# Patient Record
Sex: MALE | Race: WHITE
[De-identification: names, ages, dates, MRNs, and addresses within clinical notes are randomized per-mention and may not be internally consistent; named-entity substitution may affect disease eponyms.]

---

## 2020-04-16 ENCOUNTER — HOSPITAL ENCOUNTER (OUTPATIENT)
Dept: HOSPITAL 33 - ED | Age: 85
Setting detail: OBSERVATION
LOS: 2 days | Discharge: HOME | End: 2020-04-18
Attending: FAMILY MEDICINE | Admitting: FAMILY MEDICINE
Payer: MEDICARE

## 2020-04-16 DIAGNOSIS — M51.36: ICD-10-CM

## 2020-04-16 DIAGNOSIS — M48.061: ICD-10-CM

## 2020-04-16 DIAGNOSIS — E03.9: ICD-10-CM

## 2020-04-16 DIAGNOSIS — E78.00: ICD-10-CM

## 2020-04-16 DIAGNOSIS — Z79.01: ICD-10-CM

## 2020-04-16 DIAGNOSIS — E53.8: ICD-10-CM

## 2020-04-16 DIAGNOSIS — I71.4: ICD-10-CM

## 2020-04-16 DIAGNOSIS — I25.10: ICD-10-CM

## 2020-04-16 DIAGNOSIS — M54.9: ICD-10-CM

## 2020-04-16 DIAGNOSIS — Z79.899: ICD-10-CM

## 2020-04-16 DIAGNOSIS — I10: ICD-10-CM

## 2020-04-16 DIAGNOSIS — M54.5: Primary | ICD-10-CM

## 2020-04-16 DIAGNOSIS — Z85.51: ICD-10-CM

## 2020-04-16 DIAGNOSIS — R00.1: ICD-10-CM

## 2020-04-16 LAB
ALBUMIN SERPL-MCNC: 4.4 G/DL (ref 3.5–5)
ALP SERPL-CCNC: 103 U/L (ref 38–126)
ALT SERPL-CCNC: 19 U/L (ref 0–50)
ANION GAP SERPL CALC-SCNC: 12.1 MEQ/L (ref 5–15)
AST SERPL QL: 26 U/L (ref 17–59)
BASOPHILS # BLD AUTO: 0.04 10*3/UL (ref 0–0.4)
BASOPHILS NFR BLD AUTO: 0.7 % (ref 0–0.4)
BILIRUB BLD-MCNC: 0.7 MG/DL (ref 0.2–1.3)
BUN SERPL-MCNC: 19 MG/DL (ref 9–20)
CALCIUM SPEC-MCNC: 10.8 MG/DL (ref 8.4–10.2)
CHLORIDE SERPL-SCNC: 106 MMOL/L (ref 98–107)
CO2 SERPL-SCNC: 26 MMOL/L (ref 22–30)
CREAT SERPL-MCNC: 1.25 MG/DL (ref 0.66–1.25)
EOSINOPHIL # BLD AUTO: 0.18 10*3/UL (ref 0–0.5)
GLUCOSE SERPL-MCNC: 95 MG/DL (ref 74–106)
GLUCOSE UR-MCNC: NEGATIVE MG/DL
HCT VFR BLD AUTO: 42.2 % (ref 42–50)
HGB BLD-MCNC: 14.1 GM/DL (ref 12.5–18)
INR PPP: 3.31 (ref 0.8–3)
LYMPHOCYTES # SPEC AUTO: 1.01 10*3/UL (ref 1–4.6)
MCH RBC QN AUTO: 32 PG (ref 26–32)
MCHC RBC AUTO-ENTMCNC: 33.4 G/DL (ref 32–36)
MONOCYTES # BLD AUTO: 0.39 10*3/UL (ref 0–1.3)
PLATELET # BLD AUTO: 160 K/MM3 (ref 150–450)
POTASSIUM SERPLBLD-SCNC: 3.9 MMOL/L (ref 3.5–5.1)
PROT SERPL-MCNC: 7.8 G/DL (ref 6.3–8.2)
PROT UR STRIP-MCNC: NEGATIVE MG/DL
PROTHROMBIN TIME: 38.3 SECONDS (ref 8.83–12.87)
RBC # BLD AUTO: 4.41 M/MM3 (ref 4.1–5.6)
RBC #/AREA URNS HPF: (no result) /HPF (ref 0–2)
SODIUM SERPL-SCNC: 140 MMOL/L (ref 137–145)
WBC # BLD AUTO: 5.8 K/MM3 (ref 4–10.5)
WBC #/AREA URNS HPF: (no result) /HPF (ref 0–5)

## 2020-04-16 PROCEDURE — 85610 PROTHROMBIN TIME: CPT

## 2020-04-16 PROCEDURE — 36415 COLL VENOUS BLD VENIPUNCTURE: CPT

## 2020-04-16 PROCEDURE — 71275 CT ANGIOGRAPHY CHEST: CPT

## 2020-04-16 PROCEDURE — G0378 HOSPITAL OBSERVATION PER HR: HCPCS

## 2020-04-16 PROCEDURE — 83735 ASSAY OF MAGNESIUM: CPT

## 2020-04-16 PROCEDURE — 83605 ASSAY OF LACTIC ACID: CPT

## 2020-04-16 PROCEDURE — 99284 EMERGENCY DEPT VISIT MOD MDM: CPT

## 2020-04-16 PROCEDURE — 72131 CT LUMBAR SPINE W/O DYE: CPT

## 2020-04-16 PROCEDURE — 96376 TX/PRO/DX INJ SAME DRUG ADON: CPT

## 2020-04-16 PROCEDURE — 81001 URINALYSIS AUTO W/SCOPE: CPT

## 2020-04-16 PROCEDURE — 74174 CTA ABD&PLVS W/CONTRAST: CPT

## 2020-04-16 PROCEDURE — 80053 COMPREHEN METABOLIC PANEL: CPT

## 2020-04-16 PROCEDURE — 76376 3D RENDER W/INTRP POSTPROCES: CPT

## 2020-04-16 PROCEDURE — 85025 COMPLETE CBC W/AUTO DIFF WBC: CPT

## 2020-04-16 PROCEDURE — 96374 THER/PROPH/DIAG INJ IV PUSH: CPT

## 2020-04-16 PROCEDURE — 96375 TX/PRO/DX INJ NEW DRUG ADDON: CPT

## 2020-04-16 PROCEDURE — 84443 ASSAY THYROID STIM HORMONE: CPT

## 2020-04-16 PROCEDURE — 36000 PLACE NEEDLE IN VEIN: CPT

## 2020-04-16 PROCEDURE — 83690 ASSAY OF LIPASE: CPT

## 2020-04-16 PROCEDURE — 82607 VITAMIN B-12: CPT

## 2020-04-16 PROCEDURE — 93268 ECG RECORD/REVIEW: CPT

## 2020-04-16 RX ADMIN — LEVOTHYROXINE SODIUM SCH MCG: 75 TABLET ORAL at 22:16

## 2020-04-16 RX ADMIN — LEVOTHYROXINE SODIUM SCH MCG: 100 TABLET ORAL at 22:16

## 2020-04-16 RX ADMIN — SIMVASTATIN SCH MG: 20 TABLET, FILM COATED ORAL at 22:16

## 2020-04-16 RX ADMIN — AMIODARONE HYDROCHLORIDE SCH MG: 200 TABLET ORAL at 22:16

## 2020-04-16 RX ADMIN — WARFARIN SODIUM SCH MG: 5 TABLET ORAL at 22:17

## 2020-04-16 NOTE — XRAY
Indication: Rib related.  Low back pain.



Axial, coronal, and sagittal reformatted images of the abdomen obtained using

the raw data from the CT lumbar spine study of the same day.



Extensive scattered aortoiliac calcifications including origin of the left and

right main renal arteries.  Distal fusiform AAA measures 4.3 x 5.0 x 3.2 cm in

greatest AP, transverse, and CC projections respectively.  Smaller 3.5 x 2.7

cm fusiform aneurysm just inferior to the main renal arteries.  Lack of IV

contrast precludes further characterization.  No free fluid/air.



Elsewhere right kidney demonstrates a few cysts, largest 1.5 cm.  Tiny 4 mm

gallstone.  Tiny calcified hepatic/splenic granulomas.  Mild diffuse scattered

colonic fecal debris.  Incompletely visualized markedly distended urinary

bladder concerning for outlet obstruction versus neurogenic bladder.



Remaining visualized liver, pancreas, spleen, adrenal glands, kidneys, and

proximal ureters appear unremarkable for noncontrast exam.



Impression:

1.  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

2.  Incompletely visualized markedly distended urinary bladder.  Rule out

outlet obstruction versus neurogenic bladder.

3.  Incidental tiny gallstone, right renal cysts, mild fecal stasis, and

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

## 2020-04-16 NOTE — ERPHSYRPT
- History of Present Illness


Time Seen by Provider: 04/16/20 13:55


Source: patient


Exam Limitations: no limitations


Patient Subjective Stated Complaint: pain in right lower back with no radiation


Triage Nursing Assessment: Pt brought to the ER by EMS, pt was mowing the grass 

when he twisted his back, pt in severe pain in right lower back above his hip, 

denies radiation of pain, rates pain 10/10, monika LE swelling, hypertensive, pt 

reports having this pain on monika sides in the past that lasted approx 3 days and 

then it would go away, no difficulties with strength


Physician History: 





Is an 84-year-old white male who presents with a complaint of severe back pain 

right side just above the buttocks and the lower back he was mowing his yard 

and the pain became more severe this is been going on and off for 2 weeks but 

not getting better he denies any nausea vomiting diarrhea change in urination 

or abdominal pain.  He has no known injury has a history of sciatica.


Timing/Duration: week(s) (2)


Method of Injury: unknown


Quality: sharp, stabbing


Back Pain Location: lumbar spine


Back Pain Radiation: buttocks (rioght)


Modifying Factors: Improves With: nothing


Associated Symptoms: denies symptoms


Previous symptoms: same symptoms as today


Allergies/Adverse Reactions: 








amlodipine [From Norvasc] Allergy (Verified 04/16/20 14:02)


 


oxytetracycline [From Terramycin] Allergy (Verified 04/16/20 14:02)


 





Home Medications: 








Amiodarone HCl 200 mg PO DAILY 04/16/20 [History]


Benazepril HCl 40 mg PO DAILY 04/16/20 [History]


Diltiazem HCl [Diltiazem ER] 240 mg PO DAILY 04/16/20 [History]


Hydrochlorothiazide 25 mg*** [hydroDIURIL 25 MG***] 25 mg PO DAILY 04/16/20 [

History]


Levothyroxine Sodium [Synthroid] 175 mcg PO DAILY 04/16/20 [History]


Lovastatin 40 mg PO DAILY 04/16/20 [History]


Meloxicam 7.5 mg*** [Mobic 7.5 MG***] 7.5 mg PO DAILY 04/16/20 [History]


Metoprolol Succinate 100 mg*** [Toprol Xl 100 MG***] 50 mg PO DAILY 04/16/20 [

History]


Metoprolol Tartrate 100 mg PO DAILY 04/16/20 [History]


Terazosin HCl 1 mg*** [Hytrin 1 mg***] 1 mg PO DAILY 04/16/20 [History]


Warfarin Sodium 2.5 mg*** [Coumadin 2.5 MG***] 2.5 mg PO DAILY 04/16/20 [History

]








Travel Risk





- International Travel


Have you traveled outside of the country in past 3 weeks: No


Have you or anyone close to you been diagnosed with or: No


Do your reside in a community with a known COVID-19 case?: No


If Yes where:: [facility]





- Coronavirus Screening


Has patient experienced Coronavirus symptoms: No





- Review of Systems


Constitutional: No Fever, No Chills


Eyes: No Symptoms


Ears, Nose, & Throat: No Symptoms


Respiratory: No Cough, No Dyspnea


Cardiac: No Chest Pain, No Edema, No Syncope


Abdominal/Gastrointestinal: No Abdominal Pain, No Nausea, No Vomiting, No 

Diarrhea


Genitourinary Symptoms: No Dysuria


Musculoskeletal: Back Pain, No Neck Pain


Skin: No Rash


Neurological: No Dizziness, No Focal Weakness, No Sensory Changes


Psychological: No Symptoms


Endocrine: No Symptoms


All Other Systems: Reviewed and Negative





- Past Medical History


Pertinent Past Medical History: Yes


Cardiac History: Arrhythmia, Hypertension


 History: Bladder Cancer





- Past Surgical History


Past Surgical History: Yes


Genitourinary: Other





- Social History


Smoking Status: Never smoker


Exposure to second hand smoke: No


Drug Use: none


Patient Lives Alone: No





- Nursing Vital Signs


Nursing Vital Signs: 


 Initial Vital Signs











Temperature  97.4 F   04/16/20 13:49


 


Pulse Rate  65   04/16/20 13:49


 


Blood Pressure  226/96   04/16/20 13:49


 


O2 Sat by Pulse Oximetry  99   04/16/20 13:49








 Pain Scale











Pain Intensity [Right Distal   10





Back]                          


 


Pain Intensity                 4

















- Physical Exam


General Appearance: no apparent distress, alert


Eye Exam: PERRL/EOMI, eyes nml inspection


Neck Exam: normal inspection, non-tender, supple, full range of motion, No 

meningismus, No midline tenderness


Respiratory Exam: normal breath sounds, lungs clear, No respiratory distress


Cardiovascular Exam: regular rate/rhythm, normal heart sounds


Gastrointestinal Exam: soft, No tenderness, No mass


Back Exam: vertebral tenderness, decreased range of motion, muscle spasm, point 

tenderness (Point tenderness over the right SI joint), No normal range of motion


Extremity Exam: normal inspection, normal range of motion, No calf tenderness, 

No pedal edema


Neurologic Exam: alert, oriented x 3, cooperative, CNs II-XII nml as tested, 

normal mood/affect, nml station & gait, sensation nml, No motor deficits


Skin Exam: normal color, warm, dry, No rash


SpO2: 99





- CT Exams


  ** Lumbar Spine


CT Interpretation: Other (Scans were done which initially was to look at the 

lumbar spine which does show degenerative changes and bilateral multilevel 

foraminal stenosis at the same time an incidental finding of a abdominal aortic 

aneurysm was noted that was followed up with reconstructions of the original 

CT.  Those were felt to be inadequate we did talk to [facility] Dr. Almodovar who asked that we do a CTA of the abdomen that was done and it does 

not show any dissection or hemorrhage.  Family does report a history of 

previous aneurysm which has apparently been lost to follow-up.)


Ordered Tests: 


 Active Orders 24 hr











 Category Date Time Status


 


 CTA ABD/PEL W AND/OR W/O CONTR [CT] Stat Exams  04/16/20 16:39 Taken


 


 CTA CHEST W AND/OR WO [CT] Stat Exams  04/16/20 16:39 Taken


 


 LUMBAR SPINE W/O [CT] Stat Exams  04/16/20 13:52 Completed


 


 RECONSTRUCTION [CT] Stat Exams  04/16/20 15:07 Completed


 


 CBC W DIFF Stat Lab  04/16/20 14:00 Completed


 


 CMP Stat Lab  04/16/20 14:00 Completed


 


 LIPASE Stat Lab  04/16/20 14:00 Completed


 


 Lactic Acid Stat Lab  04/16/20 14:10 Completed


 


 PROTIME WITH INR Stat Lab  04/16/20 14:00 Completed


 


 UA W/RFX UR CULTURE Stat Lab  04/16/20 14:18 Completed








Medication Summary











Generic Name Dose Route Start Last Admin





  Trade Name Freq  PRN Reason Stop Dose Admin


 


Sodium Chloride  1,000 mls @ 100 mls/hr  04/16/20 14:00  04/16/20 14:27





  Sodium Chloride 0.9% 1000 Ml  IV  05/16/20 13:59  100 mls/hr





  .Q10H BRIJESH   Administration





     





     





     





     














Discontinued Medications














Generic Name Dose Route Start Last Admin





  Trade Name Freq  PRN Reason Stop Dose Admin


 


Clonidine  0.1 mg  04/16/20 14:13  04/16/20 14:37





  Catapres 0.1 Mg***  PO  04/16/20 14:14  0.1 mg





  STAT ONE   Administration





     





     





     





     


 


Clonidine  Confirm  04/16/20 14:33  





  Catapres 0.1 Mg***  Administered  04/16/20 14:34  





  Dose   





  0.1 mg   





  .ROUTE   





  .STK-MED ONE   





     





     





     





     


 


Dexamethasone Sodium Phosphate  10 mg  04/16/20 13:53  04/16/20 14:26





  Decadron 10mg Inj.  IV  04/16/20 13:54  10 mg





  STAT ONE   Administration





     





     





     





     


 


Dexamethasone Sodium Phosphate  Confirm  04/16/20 14:24  





  Decadron 10mg Inj.  Administered  04/16/20 14:25  





  Dose   





  10 mg   





  .ROUTE   





  .STK-MED ONE   





     





     





     





     


 


Hydromorphone HCl  1 mg  04/16/20 13:53  04/16/20 14:26





  Hydromorphone 1 Mg/Ml Ampule***  IV  04/16/20 13:54  1 mg





  STAT ONE   Administration





     





     





     





     


 


Hydromorphone HCl  Confirm  04/16/20 14:24  





  Hydromorphone 1 Mg/Ml Ampule***  Administered  04/16/20 14:25  





  Dose   





  1 mg   





  .ROUTE   





  .STK-MED ONE   





     





     





     





     


 


Hydromorphone HCl  1 mg  04/16/20 17:44  04/16/20 17:52





  Hydromorphone 1 Mg/Ml Ampule***  IV  04/16/20 17:45  1 mg





  STAT ONE   Administration





     





     





     





     


 


Hydromorphone HCl  Confirm  04/16/20 17:50  





  Hydromorphone 1 Mg/Ml Ampule***  Administered  04/16/20 17:51  





  Dose   





  1 mg   





  .ROUTE   





  .STK-MED ONE   





     





     





     





     


 


Ondansetron HCl  4 mg  04/16/20 13:53  04/16/20 14:26





  Zofran 4 Mg/2 Ml Vial**  IV  04/16/20 13:54  4 mg





  STAT ONE   Administration





     





     





     





     


 


Ondansetron HCl  Confirm  04/16/20 14:24  





  Zofran 4 Mg/2 Ml Vial**  Administered  04/16/20 14:25  





  Dose   





  4 mg   





  .ROUTE   





  .STK-MED ONE   





     





     





     





     











Lab/Rad Data: 


 Laboratory Result Diagrams





 04/16/20 14:00 





 04/16/20 14:00 





 Laboratory Results











  04/16/20 04/16/20 04/16/20 Range/Units





  14:18 14:10 14:00 


 


WBC     (4.0-10.5)  K/mm3


 


RBC     (4.1-5.6)  M/mm3


 


Hgb     (12.5-18.0)  gm/dl


 


Hct     (42-50)  %


 


MCV     ()  fl


 


MCH     (26-32)  pg


 


MCHC     (32-36)  g/dl


 


RDW     (11.5-14.0)  %


 


Plt Count     (150-450)  K/mm3


 


MPV     (7.5-11.0)  fl


 


Gran %     (36.0-66.0)  %


 


Eos # (Auto)     (0-0.5)  


 


Absolute Lymphs (auto)     (1.0-4.6)  


 


Absolute Monos (auto)     (0.0-1.3)  


 


Lymphocytes %     (24.0-44.0)  %


 


Monocytes %     (0.0-12.0)  %


 


Eosinophils %     (0.00-5.0)  %


 


Basophils %     (0.0-0.4)  %


 


Absolute Granulocytes     (1.4-6.9)  


 


Basophils #     (0-0.4)  


 


PT    38.3 H  (8.83-12.87)  SECONDS


 


INR    3.31 H  (0.8-3.0)  


 


Sodium     (137-145)  mmol/L


 


Potassium     (3.5-5.1)  mmol/L


 


Chloride     ()  mmol/L


 


Carbon Dioxide     (22-30)  mmol/L


 


Anion Gap     (5-15)  MEQ/L


 


BUN     (9-20)  mg/dL


 


Creatinine     (0.66-1.25)  mg/dL


 


Estimated GFR     ML/MIN


 


Glucose     ()  mg/dL


 


Lactic Acid   1.3   (0.4-2.0)  


 


Calcium     (8.4-10.2)  mg/dL


 


Total Bilirubin     (0.2-1.3)  mg/dL


 


AST     (17-59)  U/L


 


ALT     (0-50)  U/L


 


Alkaline Phosphatase     ()  U/L


 


Serum Total Protein     (6.3-8.2)  g/dL


 


Albumin     (3.5-5.0)  g/dL


 


Lipase     ()  U/L


 


Urine Color  STRAW    (YELLOW)  


 


Urine Appearance  CLEAR    (CLEAR)  


 


Urine pH  8.0    (5-6)  


 


Ur Specific Gravity  1.004    (1.005-1.025)  


 


Urine Protein  NEGATIVE    (Negative)  


 


Urine Ketones  NEGATIVE    (NEGATIVE)  


 


Urine Blood  NEGATIVE    (0-5)  Lane/ul


 


Urine Nitrite  NEGATIVE    (NEGATIVE)  


 


Urine Bilirubin  NEGATIVE    (NEGATIVE)  


 


Urine Urobilinogen  NEGATIVE    (0-1)  mg/dL


 


Ur Leukocyte Esterase  NEGATIVE    (NEGATIVE)  


 


Urine WBC (Auto)  NONE    (0-5)  /HPF


 


Urine RBC (Auto)  3-5    (0-2)  /HPF


 


U Epithel Cells (Auto)  NONE    (FEW)  /HPF


 


Urine Bacteria (Auto)  NONE    (NEGATIVE)  /HPF


 


Urine Culture Reflexed  NO    (NO)  


 


Urine Glucose  NEGATIVE    (NEGATIVE)  mg/dL














  04/16/20 04/16/20 04/16/20 Range/Units





  14:00 14:00 14:00 


 


WBC    5.8  (4.0-10.5)  K/mm3


 


RBC    4.41  (4.1-5.6)  M/mm3


 


Hgb    14.1  (12.5-18.0)  gm/dl


 


Hct    42.2  (42-50)  %


 


MCV    95.7  ()  fl


 


MCH    32.0  (26-32)  pg


 


MCHC    33.4  (32-36)  g/dl


 


RDW    14.6 H  (11.5-14.0)  %


 


Plt Count    160  (150-450)  K/mm3


 


MPV    10.2  (7.5-11.0)  fl


 


Gran %    72.1 H  (36.0-66.0)  %


 


Eos # (Auto)    0.18  (0-0.5)  


 


Absolute Lymphs (auto)    1.01  (1.0-4.6)  


 


Absolute Monos (auto)    0.39  (0.0-1.3)  


 


Lymphocytes %    17.4 L  (24.0-44.0)  %


 


Monocytes %    6.7  (0.0-12.0)  %


 


Eosinophils %    3.1  (0.00-5.0)  %


 


Basophils %    0.7  (0.0-0.4)  %


 


Absolute Granulocytes    4.20  (1.4-6.9)  


 


Basophils #    0.04  (0-0.4)  


 


PT     (8.83-12.87)  SECONDS


 


INR     (0.8-3.0)  


 


Sodium   140   (137-145)  mmol/L


 


Potassium   3.9   (3.5-5.1)  mmol/L


 


Chloride   106   ()  mmol/L


 


Carbon Dioxide   26   (22-30)  mmol/L


 


Anion Gap   12.1   (5-15)  MEQ/L


 


BUN   19   (9-20)  mg/dL


 


Creatinine   1.25   (0.66-1.25)  mg/dL


 


Estimated GFR   58.6   ML/MIN


 


Glucose   95   ()  mg/dL


 


Lactic Acid     (0.4-2.0)  


 


Calcium   10.8 H   (8.4-10.2)  mg/dL


 


Total Bilirubin   0.70   (0.2-1.3)  mg/dL


 


AST   26   (17-59)  U/L


 


ALT   19   (0-50)  U/L


 


Alkaline Phosphatase   103   ()  U/L


 


Serum Total Protein   7.8   (6.3-8.2)  g/dL


 


Albumin   4.4   (3.5-5.0)  g/dL


 


Lipase  43    ()  U/L


 


Urine Color     (YELLOW)  


 


Urine Appearance     (CLEAR)  


 


Urine pH     (5-6)  


 


Ur Specific Gravity     (1.005-1.025)  


 


Urine Protein     (Negative)  


 


Urine Ketones     (NEGATIVE)  


 


Urine Blood     (0-5)  Lane/ul


 


Urine Nitrite     (NEGATIVE)  


 


Urine Bilirubin     (NEGATIVE)  


 


Urine Urobilinogen     (0-1)  mg/dL


 


Ur Leukocyte Esterase     (NEGATIVE)  


 


Urine WBC (Auto)     (0-5)  /HPF


 


Urine RBC (Auto)     (0-2)  /HPF


 


U Epithel Cells (Auto)     (FEW)  /HPF


 


Urine Bacteria (Auto)     (NEGATIVE)  /HPF


 


Urine Culture Reflexed     (NO)  


 


Urine Glucose     (NEGATIVE)  mg/dL














- Progress


Progress: unchanged





- Departure


Departure Disposition: Observation


Clinical Impression: 


 Intractable back pain, Abdominal aortic aneurysm





Condition: Fair


Critical Care Time: No

## 2020-04-16 NOTE — XRAY
Indication: Low back pain.



Multiple contiguous axial images obtained through the lumbar spine.

Two-dimensional sagittal and coronal reformatted images obtained.



Comparison: None



Osseous structures demineralized consistent with patient's age.  There is

moderate multilevel bridging/nonbridging thoracolumbar osteophytes.



The T12-L4 disc levels are negative for large disc herniation or spinal canal

stenosis.  Facets are symmetric with mild bilateral degenerative facet

hypertrophy.



At the L4-L5 level, there is spinal canal narrowing due to combination of mild

annular disc bulge and moderate bilateral degenerative facet/ligamentum flavum

hypertrophy.  Mean AP thecal sac diameter is 5-6 mm.  Also bilateral foraminal

narrowing.



At the L5-S1 level, there is minimal broad-based disc bulge and moderate

bilateral degenerative facet hypertrophy with subsequent bilateral foraminal

narrowing.



Sagittal and coronal reformatted images demonstrates normal alignment with

vertebral body heights/disc spaces maintained.  No acute compression fracture

or subluxation.



Visualized noncontrasted soft tissues demonstrates extensive aortic

calcifications with incompletely visualized distal AAA at least 4.8 cm in

diameter.



Impression:

1.  Osteopenia and multilevel degenerative disc disease, greatest L4-L5 level.

 Outpatient MRI may yield further information.

2.  Negative for acute fracture or subluxation.

3.  Extensive arteriosclerotic disease with incompletely visualized distal AAA.

## 2020-04-17 LAB
ALBUMIN SERPL-MCNC: 4 G/DL (ref 3.5–5)
ALP SERPL-CCNC: 96 U/L (ref 38–126)
ALT SERPL-CCNC: 18 U/L (ref 0–50)
ANION GAP SERPL CALC-SCNC: 12.5 MEQ/L (ref 5–15)
AST SERPL QL: 26 U/L (ref 17–59)
BASOPHILS # BLD AUTO: 0.01 10*3/UL (ref 0–0.4)
BASOPHILS NFR BLD AUTO: 0.2 % (ref 0–0.4)
BILIRUB BLD-MCNC: 0.6 MG/DL (ref 0.2–1.3)
BUN SERPL-MCNC: 22 MG/DL (ref 9–20)
CALCIUM SPEC-MCNC: 10.9 MG/DL (ref 8.4–10.2)
CHLORIDE SERPL-SCNC: 107 MMOL/L (ref 98–107)
CO2 SERPL-SCNC: 24 MMOL/L (ref 22–30)
CREAT SERPL-MCNC: 1.05 MG/DL (ref 0.66–1.25)
EOSINOPHIL # BLD AUTO: 0.01 10*3/UL (ref 0–0.5)
GLUCOSE SERPL-MCNC: 136 MG/DL (ref 74–106)
HCT VFR BLD AUTO: 43.1 % (ref 42–50)
HGB BLD-MCNC: 14.3 GM/DL (ref 12.5–18)
INR PPP: 2.74 (ref 0.8–3)
LYMPHOCYTES # SPEC AUTO: 0.46 10*3/UL (ref 1–4.6)
MAGNESIUM SERPL-MCNC: 2.3 MG/DL (ref 1.6–2.3)
MCH RBC QN AUTO: 31.7 PG (ref 26–32)
MCHC RBC AUTO-ENTMCNC: 33.2 G/DL (ref 32–36)
MONOCYTES # BLD AUTO: 0.08 10*3/UL (ref 0–1.3)
PLATELET # BLD AUTO: 164 K/MM3 (ref 150–450)
POTASSIUM SERPLBLD-SCNC: 4.1 MMOL/L (ref 3.5–5.1)
PROT SERPL-MCNC: 7.4 G/DL (ref 6.3–8.2)
PROTHROMBIN TIME: 31.6 SECONDS (ref 8.83–12.87)
RBC # BLD AUTO: 4.51 M/MM3 (ref 4.1–5.6)
SODIUM SERPL-SCNC: 139 MMOL/L (ref 137–145)
VIT B12 SERPL-MCNC: 244 PG/ML (ref 239–931)
WBC # BLD AUTO: 5.2 K/MM3 (ref 4–10.5)

## 2020-04-17 RX ADMIN — CYCLOBENZAPRINE HYDROCHLORIDE SCH: 10 TABLET, FILM COATED ORAL at 14:52

## 2020-04-17 RX ADMIN — WARFARIN SODIUM SCH MG: 5 TABLET ORAL at 17:09

## 2020-04-17 RX ADMIN — LEVOTHYROXINE SODIUM SCH MCG: 100 TABLET ORAL at 21:41

## 2020-04-17 RX ADMIN — CYCLOBENZAPRINE HYDROCHLORIDE SCH MG: 10 TABLET, FILM COATED ORAL at 09:41

## 2020-04-17 RX ADMIN — LEVOTHYROXINE SODIUM SCH MCG: 75 TABLET ORAL at 09:42

## 2020-04-17 RX ADMIN — DILTIAZEM HYDROCHLORIDE SCH MG: 120 CAPSULE, COATED, EXTENDED RELEASE ORAL at 09:41

## 2020-04-17 RX ADMIN — CYCLOBENZAPRINE HYDROCHLORIDE SCH MG: 10 TABLET, FILM COATED ORAL at 17:08

## 2020-04-17 RX ADMIN — SIMVASTATIN SCH MG: 20 TABLET, FILM COATED ORAL at 21:41

## 2020-04-17 RX ADMIN — DEXAMETHASONE SODIUM PHOSPHATE SCH MG: 10 INJECTION INTRAMUSCULAR; INTRAVENOUS at 21:40

## 2020-04-17 RX ADMIN — HYDROCHLOROTHIAZIDE SCH MG: 25 TABLET ORAL at 09:42

## 2020-04-17 RX ADMIN — CYCLOBENZAPRINE HYDROCHLORIDE SCH MG: 10 TABLET, FILM COATED ORAL at 21:39

## 2020-04-17 RX ADMIN — AMIODARONE HYDROCHLORIDE SCH: 200 TABLET ORAL at 21:40

## 2020-04-17 RX ADMIN — BENAZEPRIL HYDROCHLORIDE SCH MG: 10 TABLET, COATED ORAL at 05:13

## 2020-04-17 RX ADMIN — TERAZOSIN HYDROCHLORIDE ANHYDROUS SCH MG: 1 CAPSULE ORAL at 09:42

## 2020-04-17 NOTE — XRAY
Indication: Back pain.  Aortic aneurysm.



Conventional contrast enhanced CTA chest was performed using 80 cc Isovue 370

contrast.  Two-dimensional sagittal and coronal reformatted images obtained.

Additional 3-dimensional reformatted images obtained using a separate

workstation.



Comparison: None



Thoracic aorta demonstrates moderate scattered arteriosclerotic calcifications

without aneurysm/dissection.  Heart is enlarged with additional scattered

coronary calcifications.  [place] subcarinal and small right perihilar

calcified nodes.  No pathologic mediastinal/hilar lymphadenopathy.



Lungs hyperinflated with diffuse peripheral fibrosis/scarring bilaterally

greatest in lung bases.  Small posterior right lower lobe calcified granuloma.

 No suspicious pulmonary mass, infiltrate, or effusion.



Bony thorax intact with osteopenia.  Also moderate flowing osteophytes

throughout the spine.



CTA abdomen/pelvis reported separately.



Impression:

1.  Moderate aortic and coronary arteriosclerotic calcifications.  Negative

aneurysm/dissection.

2.  Cardiomegaly, pulmonary fibrosis/scarring, chronic bony findings, and

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

3.  No acute cardiopulmonary abnormalities.

## 2020-04-17 NOTE — XRAY
Indication: Back pain.  Aortic aneurysm.



Conventional contrast enhanced CTA abdomen/pelvis was performed using 80 cc

Isovue 370 contrast.  Two-dimensional sagittal and coronal reformatted images

obtained.  Additional 3-dimensional reformatted images obtained using a

separate workstation.



Comparison: None.  There is CT lumbar spine performed earlier in the day.



CTA chest reported separately.



Abdominal aorta demonstrates moderate scattered arteriosclerotic

calcifications including origin of main renal arteries bilaterally.  3.5 x 2.7

cm fusiform abdominal aortic aneurysm seen just inferior to the main arteries.

 Distal aorta demonstrates 4.3 x 5.0 cm fusiform aneurysm.  Negative for

active hemorrhage or dissection.



Noncontrasted stomach and bowel loops appear nonobstructed.  There is mild

diffuse scattered colonic fecal debris throughout including rectum.  No free

fluid/air.  Liver is enlarged measuring 21 cm and spleen is enlarged measuring

13 cm.  Tiny 4 mm gallstone and tiny hepatic/splenic calcified granulomas.



Right kidney demonstrates a few cysts, largest lower pole measuring 1.5 cm.

No hydronephrosis or hydroureter.  Markedly distended urinary bladder either

outlet obstruction versus neurogenic bladder.  Enlarged prostate gland does

impress on the base of the bladder.



Remaining liver, all bladder, pancreas, spleen, adrenal glands, kidneys,

ureters, and bladder appear unremarkable for noncontrast exam.  No

pathological retroperitoneal lymphadenopathy.



Osseous structures intact with osteopenia and multilevel degenerative

spondylosis.



Impression:

1.  Scattered arteriosclerotic calcifications with distal AAA as detailed.

2.  Distended urinary bladder either outlet obstruction versus neurogenic

bladder.  Outlet obstruction more likely given enlarged prostate gland.

3.  Hepatosplenomegaly, mild fecal stasis, right renal cysts, chronic bony

findings, and evidence for old granulomatous disease.

## 2020-04-17 NOTE — PCM.HP
History of Present Illness





- Chief Complaint


Chief Complaint: Intractable Back pain


History of Present Illness: 


 is a 84 year old male who was admitted through ER with intractable 

Right lumbar pain . The pain started 2 weeks ago when he had been out doing 

yard work /mowing the grass. Dr Rojas is his PCP and treated him with Mobic 

that helped initially. The pain is better when resting and worse with sit to 

stand and walking.








Medications & Allergies


Home Medications: 


 Home Medication List





Amiodarone HCl 200 mg PO HS 04/16/20 [History Confirmed 04/16/20]


Benazepril HCl 40 mg PO DAILY 04/16/20 [History Confirmed 04/16/20]


Diltiazem HCl [Diltiazem ER] 240 mg PO DAILY 04/16/20 [History Confirmed 04/16/ 20]


Hydrochlorothiazide 25 mg*** [hydroDIURIL 25 MG***] 25 mg PO DAILY 04/16/20 [

History Confirmed 04/16/20]


Levothyroxine Sodium [Synthroid] 175 mcg PO HS 04/16/20 [History Confirmed 04/16 /20]


Lovastatin 40 mg PO HS 04/16/20 [History Confirmed 04/16/20]


Meloxicam 7.5 mg*** [Mobic 7.5 MG***] 7.5 mg PO BID 04/16/20 [History Confirmed 

04/16/20]


Metoprolol Succinate 100 mg*** [Toprol Xl 100 MG***] 50 mg PO HS 04/16/20 [

History Confirmed 04/16/20]


Metoprolol Tartrate 100 mg PO DAILY 04/16/20 [History Confirmed 04/16/20]


Terazosin HCl 1 mg*** [Hytrin 1 mg***] 1 mg PO DAILY 04/16/20 [History 

Confirmed 04/16/20]


Warfarin Sodium 2.5 mg*** [Coumadin 2.5 MG***] 2.5 mg PO EVENING MEAL 04/16/20 [

History Confirmed 04/16/20]








Allergies/Adverse Reactions: 


 Allergies











Allergy/AdvReac Type Severity Reaction Status Date / Time


 


amlodipine [From Norvasc] AdvReac Severe  Verified 04/16/20 19:53


 


oxytetracycline AdvReac Severe Swelling Verified 04/16/20 19:53





[From Terramycin]   of Face  














- Past Medical History


Past Medical History: Yes


Neurological History: No Pertinent History


ENT History: Cataracts


Cardiac History: Aneurysm, Arrhythmia, High Cholesterol, Hypertension, Other


Respiratory History: No Pertinent History


Endocrine Medical History: Hypothyroidism


Musculoskelatal History: Arthritis, Degenerative Disk Disease, Other


GI Medical History: No Pertinent History


 History: Bladder Cancer


Pyscho-Social History: No Pertinent History


Male Reproductive Disorders: No Pertinent History


Comment: osteopenia, and gout.  extensive arterosclerotic disease with abd 

aortic aneurysm





- Past Surgical History


Past Surgical History: Yes


Neuro Surgical History: No Pertinent History


Cardiac History: No Pertinent History


Respiratory Surgery: No Pertinent History


GI Surgical History: No Pertinent History


Genitourinary Surgical Hx: Other


Musculskeletal Surgical Hx: No Pertinent History


Male Surgical History: No Pertinent History


Other Surgical History: hx of bladder CA with surgery.  cataract surgery





- Social History


Smoking Status: Never smoker


Exposure to second hand smoke: No


Alcohol: None


Drug Use: none





- Physical Exam


Vital Signs: 


 Vital Signs - 24 hr











  Temp Pulse Resp BP BP Pulse Ox


 


 04/17/20 14:37  97.8 F  40 L  18  126/60   93 L


 


 04/17/20 11:55  97.9 F  65  20  128/80   95


 


 04/17/20 10:00  97.5 F  71  18  151/72   95


 


 04/17/20 05:20  98.1 F  76  20  160/90   96


 


 04/17/20 02:00  97.9 F  76  20  160/90   95


 


 04/16/20 22:24     140/75  


 


 04/16/20 20:07  97.6 F  68  22  180/90  


 


 04/16/20 18:31       99


 


 04/16/20 18:03   66  22   204/94  100


 


 04/16/20 16:51   58 L  18   179/66 














Results





- Labs


Lab/Micro Results: 


 Lab Results-Last 24 Hours











  04/17/20 04/17/20 04/17/20 Range/Units





  04:25 04:25 04:25 


 


WBC  5.2    (4.0-10.5)  K/mm3


 


RBC  4.51    (4.1-5.6)  M/mm3


 


Hgb  14.3    (12.5-18.0)  gm/dl


 


Hct  43.1    (42-50)  %


 


MCV  95.6    ()  fl


 


MCH  31.7    (26-32)  pg


 


MCHC  33.2    (32-36)  g/dl


 


RDW  14.7 H    (11.5-14.0)  %


 


Plt Count  164    (150-450)  K/mm3


 


MPV  10.3    (7.5-11.0)  fl


 


Gran %  89.3 H    (36.0-66.0)  %


 


Eos # (Auto)  0.01    (0-0.5)  


 


Absolute Lymphs (auto)  0.46 L    (1.0-4.6)  


 


Absolute Monos (auto)  0.08    (0.0-1.3)  


 


Lymphocytes %  8.8 L    (24.0-44.0)  %


 


Monocytes %  1.5    (0.0-12.0)  %


 


Eosinophils %  0.2    (0.00-5.0)  %


 


Basophils %  0.2    (0.0-0.4)  %


 


Absolute Granulocytes  4.65    (1.4-6.9)  


 


Basophils #  0.01    (0-0.4)  


 


PT     (8.83-12.87)  SECONDS


 


INR     (0.8-3.0)  


 


Sodium   139   (137-145)  mmol/L


 


Potassium   4.1   (3.5-5.1)  mmol/L


 


Chloride   107   ()  mmol/L


 


Carbon Dioxide   24   (22-30)  mmol/L


 


Anion Gap   12.5   (5-15)  MEQ/L


 


BUN   22 H   (9-20)  mg/dL


 


Creatinine   1.05   (0.66-1.25)  mg/dL


 


Estimated GFR   > 60.0   ML/MIN


 


Glucose   136 H   ()  mg/dL


 


Calcium   10.9 H   (8.4-10.2)  mg/dL


 


Magnesium   2.3   (1.6-2.3)  mg/dL


 


Total Bilirubin   0.60   (0.2-1.3)  mg/dL


 


AST   26   (17-59)  U/L


 


ALT   18   (0-50)  U/L


 


Alkaline Phosphatase   96   ()  U/L


 


Serum Total Protein   7.4   (6.3-8.2)  g/dL


 


Albumin   4.0   (3.5-5.0)  g/dL


 


Vitamin B12    244  (239-931)  pg/mL


 


TSH 3rd Generation    0.643  (0.47-4.68)  mIU/L


 


Slides for Path Review      














  04/17/20 Range/Units





  09:10 


 


WBC   (4.0-10.5)  K/mm3


 


RBC   (4.1-5.6)  M/mm3


 


Hgb   (12.5-18.0)  gm/dl


 


Hct   (42-50)  %


 


MCV   ()  fl


 


MCH   (26-32)  pg


 


MCHC   (32-36)  g/dl


 


RDW   (11.5-14.0)  %


 


Plt Count   (150-450)  K/mm3


 


MPV   (7.5-11.0)  fl


 


Gran %   (36.0-66.0)  %


 


Eos # (Auto)   (0-0.5)  


 


Absolute Lymphs (auto)   (1.0-4.6)  


 


Absolute Monos (auto)   (0.0-1.3)  


 


Lymphocytes %   (24.0-44.0)  %


 


Monocytes %   (0.0-12.0)  %


 


Eosinophils %   (0.00-5.0)  %


 


Basophils %   (0.0-0.4)  %


 


Absolute Granulocytes   (1.4-6.9)  


 


Basophils #   (0-0.4)  


 


PT  31.6 H  (8.83-12.87)  SECONDS


 


INR  2.74  (0.8-3.0)  


 


Sodium   (137-145)  mmol/L


 


Potassium   (3.5-5.1)  mmol/L


 


Chloride   ()  mmol/L


 


Carbon Dioxide   (22-30)  mmol/L


 


Anion Gap   (5-15)  MEQ/L


 


BUN   (9-20)  mg/dL


 


Creatinine   (0.66-1.25)  mg/dL


 


Estimated GFR   ML/MIN


 


Glucose   ()  mg/dL


 


Calcium   (8.4-10.2)  mg/dL


 


Magnesium   (1.6-2.3)  mg/dL


 


Total Bilirubin   (0.2-1.3)  mg/dL


 


AST   (17-59)  U/L


 


ALT   (0-50)  U/L


 


Alkaline Phosphatase   ()  U/L


 


Serum Total Protein   (6.3-8.2)  g/dL


 


Albumin   (3.5-5.0)  g/dL


 


Vitamin B12   (239-931)  pg/mL


 


TSH 3rd Generation   (0.47-4.68)  mIU/L


 


Slides for Path Review   














- Radiology Impressions


Radiology Exams & Impressions: 


 Radiology Procedures











 Category Date Time Status


 


 CTA ABD/PEL W AND/OR W/O CONTR [CT] Stat Exams  04/16/20 16:39 Completed


 


 CTA CHEST W AND/OR WO [CT] Stat Exams  04/16/20 16:39 Completed


 


 LUMBAR SPINE W/O [CT] Stat Exams  04/16/20 13:52 Completed


 


 RECONSTRUCTION [CT] Stat Exams  04/16/20 15:07 Completed

## 2020-04-18 VITALS — DIASTOLIC BLOOD PRESSURE: 82 MMHG | SYSTOLIC BLOOD PRESSURE: 130 MMHG | OXYGEN SATURATION: 97 %

## 2020-04-18 VITALS — HEART RATE: 64 BPM

## 2020-04-18 RX ADMIN — BENAZEPRIL HYDROCHLORIDE SCH MG: 10 TABLET, COATED ORAL at 10:58

## 2020-04-18 RX ADMIN — TERAZOSIN HYDROCHLORIDE ANHYDROUS SCH MG: 1 CAPSULE ORAL at 10:57

## 2020-04-18 RX ADMIN — HYDROCHLOROTHIAZIDE SCH MG: 25 TABLET ORAL at 10:56

## 2020-04-18 RX ADMIN — DEXAMETHASONE SODIUM PHOSPHATE SCH MG: 10 INJECTION INTRAMUSCULAR; INTRAVENOUS at 11:02

## 2020-04-18 RX ADMIN — DILTIAZEM HYDROCHLORIDE SCH MG: 120 CAPSULE, COATED, EXTENDED RELEASE ORAL at 10:56

## 2020-04-18 RX ADMIN — LEVOTHYROXINE SODIUM SCH MCG: 75 TABLET ORAL at 10:56

## 2020-04-18 RX ADMIN — CYCLOBENZAPRINE HYDROCHLORIDE SCH MG: 10 TABLET, FILM COATED ORAL at 10:56

## 2020-04-18 NOTE — PCM.DS
Discharge Summary


Date of Admission: 


04/16/20 19:34





Admitting Physician: 


SU SAM DO





Primary Care Provider: 


VICKY GRACIA








Allergies


Allergies





amlodipine [From Norvasc] Adverse Reaction (Severe, Verified 04/16/20 19:53)


 


 coughing  


oxytetracycline [From Terramycin] Adverse Reaction (Severe, Verified 04/16/20 19

:53)


 Swelling of Face


 generalized swelling  











Hospital Summary





- Hospital Course


Hospital Course: 





Patient has had gradual relief of Right lumbar /SI area pain after IV steroid x 

3 doses and Flexeril and bedrest.. CT Lumbar showed severe DDD and multilevel 

neuroforaminal stenosis. Patient is very active at home with outdoor projects. 

He may benefit from eval and tx with Dr Velazquez ,Pain management for local 

lumbar injections and referral was made. Patient has other health issues 

followed by Cardiologist in [place],Dr Campbell group. He is on multiple cardiac 

meds and coumadin. Pulse rate was 39 yesterday . Patient states "it runs in the 

40s usually".Evening dose of Metoprolol was held and heart rate 69-70 

today.Patient will be following with his Cardiologist and PCP, Dr Gracia 

regarding his chronic health issues.





- Vitals & Intake/Output


Vital Signs: 





 Vital Signs











Temperature  97.7 F   04/18/20 08:00


 


Pulse Rate  64   04/18/20 08:00


 


Respiratory Rate  21   04/18/20 08:00


 


Blood Pressure  130/80   04/18/20 08:00


 


O2 Sat by Pulse Oximetry  96   04/18/20 08:00











Intake & Output: 





 Intake & Output











 04/16/20 04/17/20 04/18/20 04/19/20





 11:59 11:59 11:59 11:59


 


Intake Total  730 1830 


 


Output Total   100 


 


Balance  730 1730 


 


Weight  107.5 kg  














- Lab


Result Diagrams: 


 04/17/20 04:25





 04/17/20 04:25





- Radiology Exams


Ordered Rad Exams-Entire Visit: 





 Radiology Procedures











 Category Date Time Status


 


 CTA ABD/PEL W AND/OR W/O CONTR [CT] Stat Exams  04/16/20 16:39 Completed


 


 CTA CHEST W AND/OR WO [CT] Stat Exams  04/16/20 16:39 Completed


 


 LUMBAR SPINE W/O [CT] Stat Exams  04/16/20 13:52 Completed


 


 RECONSTRUCTION [CT] Stat Exams  04/16/20 15:07 Completed














- Procedures and Test


Procedures and Tests throughout Hospitalization: 





 Therapy Orders & Screens





04/17/20 09:00


OT Screen per Nursing Assess 


   Comment: Protocol Order


   Physician Instructions: Greater than 3 points order OT Admission Screening


   Reason For Exam: Triggered on Admission


   Diagnosis: Intractable Back pain


   Open Wound/Cellutlitis/Pressure Ulcers: No


   Acute Fx/ORIF/Change in wt bearing status: No


   Severe MUSCULOSKELETAL pain: Yes


   ADL Dysfunction: No


   Acute CVA w/Hemiparesis/Hemiplegia: No


   Decreased Functional Mobility/Strength: Yes


   Sprain/Strain: No


   Acute Post-op Mobility Dysfunction: No


   Total Points: 6


PT Screen per Nursing Assess ONCE 


   Comment: Protocol Order


   Physician Instructions: Greater than 3 points order PT Admission Screenin


   Reason For Exam: Triggered on Admission


   Diagnosis: Intractable Back pain


   Open Wound/Cellutlitis/Pressure Ulcers: No


   Acute Fx/ORIF/Change in wt bearing status: No


   Severe MUSCULOSKELETAL pain: Yes


   ADL Dysfunction: No


   Acute CVA w/Hemiparesis/Hemiplegia: No


   Decreased Functional Mobility/Strength: Yes


   Sprain/Strain: No


   Acute Post-op Mobility Dysfunction: No


   Total Points: 6














Discharge Exam


General Appearance: no apparent distress


Respiratory Exam: normal breath sounds


Cardiovascular Exam: regular rate/rhythm (70 rate,irregular)


Gastrointestinal/Abdomen Exam: soft





Final Diagnosis/Problem List





- Final Discharge Diagnosis/Problem


(1) Intractable back pain


Current Visit: Yes   Status: Resolved   


Assessment & Plan: 


improved and is safe to ambulate.


Code(s): M54.9 - DORSALGIA, UNSPECIFIED   





(2) Abdominal aortic aneurysm


Current Visit: Yes   Status: Acute   


Assessment & Plan: 


stable but encouraged follow up with Cardiologist.


Code(s): I71.4 - ABDOMINAL AORTIC ANEURYSM, WITHOUT RUPTURE   





(3) B12 deficiency


Current Visit: Yes   Status: Acute   


Assessment & Plan: 


B12 OTC sublingual recommended to help support the nerve endings.


Code(s): E53.8 - DEFICIENCY OF OTHER SPECIFIED B GROUP VITAMINS   





(4) Degenerative disc disease, lumbar


Current Visit: Yes   Status: Acute   Code(s): M51.36 - OTHER INTERVERTEBRAL 

DISC DEGENERATION, LUMBAR REGION   





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


Current Visit: Yes   Status: Chronic   Code(s): M99.83 - OTHER BIOMECHANICAL 

LESIONS OF LUMBAR REGION   





(6) Bradycardia


Current Visit: Yes   Status: Chronic   


Assessment & Plan: 


will follow with PCP and Cardiologist.


Code(s): R00.1 - BRADYCARDIA, UNSPECIFIED   





(7) Anticoagulant long-term use


Current Visit: Yes   Status: Acute   


Assessment & Plan: 


managed by Dr Gracia.


Code(s): Z79.01 - LONG TERM (CURRENT) USE OF ANTICOAGULANTS   





- Discharge


Disposition: Home, Self-Care


Condition: Stable


Prescriptions: 


No Action


   Diltiazem HCl [Diltiazem ER] 240 mg PO DAILY


   Hydrochlorothiazide 25 mg*** [hydroDIURIL 25 MG***] 25 mg PO DAILY


   Amiodarone HCl 200 mg PO HS


   Warfarin Sodium 2.5 mg*** [Coumadin 2.5 MG***] 2.5 mg PO EVENING MEAL


   Metoprolol Tartrate 100 mg PO DAILY


   Levothyroxine Sodium [Synthroid] 175 mcg PO HS


   Terazosin HCl 1 mg*** [Hytrin 1 mg***] 1 mg PO DAILY


   Metoprolol Succinate 100 mg*** [Toprol Xl 100 MG***] 50 mg PO HS


   Lovastatin 40 mg PO HS


   Benazepril HCl 40 mg PO DAILY


   Meloxicam 7.5 mg*** [Mobic 7.5 MG***] 7.5 mg PO BID


Follow up with: 


VICKY GRACIA MD [Primary Care Provider] - 1 Week

## 2022-05-05 ENCOUNTER — HOSPITAL ENCOUNTER (EMERGENCY)
Dept: HOSPITAL 33 - ED | Age: 87
Discharge: ANOTHER HOSPITAL | End: 2022-05-05
Payer: MEDICARE

## 2022-05-05 VITALS — SYSTOLIC BLOOD PRESSURE: 126 MMHG | DIASTOLIC BLOOD PRESSURE: 69 MMHG | HEART RATE: 56 BPM | OXYGEN SATURATION: 98 %

## 2022-05-05 DIAGNOSIS — I16.0: ICD-10-CM

## 2022-05-05 DIAGNOSIS — E78.5: ICD-10-CM

## 2022-05-05 DIAGNOSIS — G89.29: ICD-10-CM

## 2022-05-05 DIAGNOSIS — Z79.01: ICD-10-CM

## 2022-05-05 DIAGNOSIS — I25.10: ICD-10-CM

## 2022-05-05 DIAGNOSIS — I10: ICD-10-CM

## 2022-05-05 DIAGNOSIS — N17.9: ICD-10-CM

## 2022-05-05 DIAGNOSIS — M51.36: ICD-10-CM

## 2022-05-05 DIAGNOSIS — M54.50: ICD-10-CM

## 2022-05-05 DIAGNOSIS — I71.4: Primary | ICD-10-CM

## 2022-05-05 DIAGNOSIS — Z79.899: ICD-10-CM

## 2022-05-05 LAB
ALBUMIN SERPL-MCNC: 4.3 G/DL (ref 3.5–5)
ALP SERPL-CCNC: 91 U/L (ref 38–126)
ALT SERPL-CCNC: 21 U/L (ref 0–50)
ANION GAP SERPL CALC-SCNC: 14 MEQ/L (ref 5–15)
AST SERPL QL: 26 U/L (ref 17–59)
BASOPHILS # BLD AUTO: 0.03 10*3/UL (ref 0–0.4)
BILIRUB BLD-MCNC: 0.7 MG/DL (ref 0.2–1.3)
BUN SERPL-MCNC: 37 MG/DL (ref 9–20)
CALCIUM SPEC-MCNC: 10.7 MG/DL (ref 8.4–10.2)
CHLORIDE SERPL-SCNC: 102 MMOL/L (ref 98–107)
CO2 SERPL-SCNC: 26 MMOL/L (ref 22–30)
CREAT SERPL-MCNC: 1.57 MG/DL (ref 0.66–1.25)
EOSINOPHIL # BLD AUTO: 0.26 10*3/UL (ref 0–0.5)
GFR SERPLBLD BASED ON 1.73 SQ M-ARVRAT: 44.9 ML/MIN
GLUCOSE SERPL-MCNC: 92 MG/DL (ref 74–106)
GLUCOSE UR-MCNC: NEGATIVE MG/DL
HCT VFR BLD AUTO: 44.5 % (ref 42–50)
HGB BLD-MCNC: 14.8 GM/DL (ref 12.5–18)
LYMPHOCYTES # SPEC AUTO: 1.17 10*3/UL (ref 1–4.6)
MCH RBC QN AUTO: 32.5 PG (ref 26–32)
MCHC RBC AUTO-ENTMCNC: 33.3 G/DL (ref 32–36)
MONOCYTES # BLD AUTO: 0.64 10*3/UL (ref 0–1.3)
PLATELET # BLD AUTO: 194 K/MM3 (ref 150–450)
POTASSIUM SERPLBLD-SCNC: 4.4 MMOL/L (ref 3.5–5.1)
PROT SERPL-MCNC: 7.5 G/DL (ref 6.3–8.2)
PROT UR STRIP-MCNC: NEGATIVE MG/DL
RBC # BLD AUTO: 4.55 M/MM3 (ref 4.1–5.6)
RBC # UR AUTO: NEGATIVE ERY/UL (ref 0–5)
RBC #/AREA URNS HPF: (no result) /HPF (ref 0–2)
SODIUM SERPL-SCNC: 138 MMOL/L (ref 137–145)
UA DIPSTICK PNL UR: (no result)
URINE CULTURED INDICATED?: NO
WBC # BLD AUTO: 8 K/MM3 (ref 4–10.5)
WBC #/AREA URNS HPF: (no result) /HPF (ref 0–5)

## 2022-05-05 PROCEDURE — 36415 COLL VENOUS BLD VENIPUNCTURE: CPT

## 2022-05-05 PROCEDURE — 36000 PLACE NEEDLE IN VEIN: CPT

## 2022-05-05 PROCEDURE — 96375 TX/PRO/DX INJ NEW DRUG ADDON: CPT

## 2022-05-05 PROCEDURE — 72131 CT LUMBAR SPINE W/O DYE: CPT

## 2022-05-05 PROCEDURE — 99285 EMERGENCY DEPT VISIT HI MDM: CPT

## 2022-05-05 PROCEDURE — 85025 COMPLETE CBC W/AUTO DIFF WBC: CPT

## 2022-05-05 PROCEDURE — 96374 THER/PROPH/DIAG INJ IV PUSH: CPT

## 2022-05-05 PROCEDURE — 80053 COMPREHEN METABOLIC PANEL: CPT

## 2022-05-05 PROCEDURE — 75635 CT ANGIO ABDOMINAL ARTERIES: CPT

## 2022-05-05 PROCEDURE — 81015 MICROSCOPIC EXAM OF URINE: CPT

## 2022-05-05 RX ADMIN — GENTAMICIN SULFATE SCH MLS/HR: 40 INJECTION, SOLUTION INTRAMUSCULAR; INTRAVENOUS at 18:57

## 2022-05-05 RX ADMIN — GENTAMICIN SULFATE SCH MLS/HR: 40 INJECTION, SOLUTION INTRAMUSCULAR; INTRAVENOUS at 16:07

## 2022-05-05 RX ADMIN — DEXTROSE MONOHYDRATE PRN MLS/HR: 50 INJECTION, SOLUTION INTRAVENOUS at 16:43

## 2022-05-05 RX ADMIN — DEXTROSE MONOHYDRATE PRN MLS/HR: 50 INJECTION, SOLUTION INTRAVENOUS at 16:08

## 2022-05-05 NOTE — XRAY
Indication: Chronic back pain.  No known injury.



Multiple contiguous images obtained through the lumbar spine.  Sagittal and

coronal reformatted images obtained.



Comparison: April 16, 2020.



Again age-related osteopenia with moderate multilevel bridging/nonbridging

thoracolumbar osteophytes, mild/moderate multilevel degenerative disc disease,

and mild/moderate bilateral L3-S1 degenerative facet hypertrophy.  Worsening

moderate/advanced L2-L3 degenerative disc disease as evidenced by disc space

loss, bony sclerosis/spurring, tiny subcortical cysts, and tiny vacuum disc

phenomena.



Sagittal and coronal reformatted images again demonstrates normal alignment

with now L2-L3 disc space loss.  Remaining vertebral body heights/disc spaces

maintained.  No acute compression fracture or subluxation.



Visualized noncontrasted soft tissues again demonstrates extensive aortic

calcifications with 5.5 cm distal aortic aneurysm.



Impression: Worsening moderate/advanced L2-L3 degenerative disc disease.  No

acute findings.  Otherwise stable osteopenia, multilevel degenerative disc

disease, and arteriosclerotic disease with distal AAA.

## 2022-05-05 NOTE — ERPHSYRPT
- History of Present Illness


Time Seen by Provider: 05/05/22 12:10


Source: patient


Exam Limitations: no limitations


Patient Subjective Stated Complaint: pt states "I was at my doctor yesterday and

got a shot for my back but it didn't work."


Triage Nursing Assessment: pt came into the er via wheelchair; pt is axo x4; c/o

back pain; pt states 8/10 pain to left side of back; pt denies trauma or injury 

to area; pt states hx of back pain for the past couple of years; pt is grabbing 

site and moaning; tenderness to left flank area; pt denies difficulty urinating 

or pain with urination; limit ROM to back; hypertensive


Physician History: 


Patient is an 86-year-old male presents to our ED for evaluation of acute on 

chronic back pain.  Patient states that he was at his doctor's office yesterday 

for this back pain.  Patient was given an injection.  Patient states the pain 

medication did not help.  Pain described as an ache that is localized to the 

left lower back.  Pain rated at 8 out of 10.  No trauma.  No fever.  No change 

in bowel bladder function.  No saddle anesthesia.  No recent back procedures.  

No abdominal pain.  No chest pain or shortness of breath.  No nausea vomiting or

diaphoresis.  Patient voices no other complaints or concerns at this time.





Timing/Duration: yesterday


Method of Injury: unknown


Quality: aching


Back Pain Location: lumbar spine (No radiation)


Severity of Pain-Max: moderate


Severity of Pain-Current: mild


Modifying Factors: Improves With: movement (Palpation also reproduces pain.)


Associated Symptoms: denies symptoms, No fever, No chills, No sweating, No 

urinary incontinence, No loss of bowel control, No constipation, No nausea, No 

vomiting, No problems urinating, No dizziness, No weakness, No sensory/motor 

loss, No tingling in legs/feet, No lower back pain


Previous symptoms: same symptoms as today


Allergies/Adverse Reactions: 








amlodipine [From Norvasc] Adverse Reaction (Severe, Verified 05/05/22 12:07)


   


   coughing  


oxytetracycline [From Terramycin] Adverse Reaction (Severe, Verified 05/05/22 

12:07)


   Swelling of Face


   generalized swelling  


cinoxacin [From Cinobac] Adverse Reaction (Verified 05/05/22 13:06)


   


ciprofloxacin [From Cipro] Adverse Reaction (Verified 05/05/22 13:06)


   


delafloxacin [From Baxdela] Adverse Reaction (Verified 05/05/22 13:06)


   


gatifloxacin [From Tequin] Adverse Reaction (Verified 05/05/22 13:06)


   


gemifloxacin [From Factive] Adverse Reaction (Verified 05/05/22 13:06)


   


levofloxacin [From Levaquin] Adverse Reaction (Verified 05/05/22 13:06)


   


lomefloxacin [From Maxaquin] Adverse Reaction (Verified 05/05/22 13:06)


   


moxifloxacin [From Avelox] Adverse Reaction (Verified 05/05/22 13:06)


   


nalidixic acid [From NegGram] Adverse Reaction (Verified 05/05/22 13:06)


   


norfloxacin [From Noroxin] Adverse Reaction (Verified 05/05/22 13:06)


   


ofloxacin [From Ocuflox] Adverse Reaction (Verified 05/05/22 13:06)


   


sparfloxacin [From Zagam] Adverse Reaction (Verified 05/05/22 13:06)


   


trovafloxacin [From Trovan] Adverse Reaction (Verified 05/05/22 13:06)


   





Home Medications: 








Amiodarone HCl 200 mg PO HS 04/16/20 [History]


Benazepril HCl 40 mg PO DAILY 04/16/20 [History]


Diltiazem HCl [Diltiazem ER] 240 mg PO DAILY 04/16/20 [History]


Hydrochlorothiazide 25 mg*** [hydroDIURIL 25 MG***] 25 mg PO DAILY 04/16/20 

[History]


Lovastatin 40 mg PO HS 04/16/20 [History]


Meloxicam 7.5 mg*** [Mobic 7.5 MG***] 7.5 mg PO BID 04/16/20 [History]


Metoprolol Succinate 100 mg*** [Toprol Xl 100 MG***] 100 mg PO HS 04/16/20 

[History]


Terazosin HCl 1 mg*** [Hytrin 1 mg***] 1 mg PO DAILY 04/16/20 [History]


Levothyroxine Sodium 100 Mcg** [Synthroid 100 Mcg***] 200 mcg PO DAILY 04/20/22 

[History]


Warfarin Sodium 2 mg PO DAILY 04/20/22 [History]





Hx Tetanus, Diphtheria Vaccination/Date Given: Yes


Hx Influenza Vaccination/Date Given: No


Hx Pneumococcal Vaccination/Date Given: Yes





Travel Risk





- International Travel


Have you traveled outside of the country in past 3 weeks: No





- Coronavirus Screening


Are you exhibiting any of the following symptoms?: No


Close contact with a COVID-19 positive Pt in past 14-21 Days: No





- Vaccine Status


Have you recieved a Covid-19 vaccination: Yes


: Moderna





- Vaccination Dates


Date of 2cond Vaccination (if applicable): unknown





- Review of Systems


Constitutional: No Symptoms, No Fever, No Chills


Eyes: No Symptoms


Ears, Nose, & Throat: No Symptoms


Respiratory: No Symptoms, No Cough, No Dyspnea


Cardiac: No Symptoms, No Chest Pain, No Edema, No Syncope


Abdominal/Gastrointestinal: No Symptoms, No Abdominal Pain, No Nausea, No 

Vomiting, No Diarrhea


Genitourinary Symptoms: No Symptoms, No Dysuria


Musculoskeletal: No Symptoms, No Back Pain, No Neck Pain


Skin: No Symptoms, No Rash


Neurological: No Symptoms, No Dizziness, No Focal Weakness, No Sensory Changes


Psychological: No Symptoms


Endocrine: No Symptoms


Hematologic/Lymphatic: No Symptoms


Immunological/Allergic: No Symptoms


All Other Systems: Reviewed and Negative





- Past Medical History


Pertinent Past Medical History: Yes


Neurological History: No Pertinent History


ENT History: Cataracts


Cardiac History: Aneurysm, Arrhythmia, High Cholesterol, Hypertension, Other


Respiratory History: No Pertinent History


Endocrine Medical History: Hypothyroidism


Musculoskeletal History: Other, Arthritis, Degenerative Disk Disease


GI Medical History: No Pertinent History


 History: Bladder Cancer


Psycho-Social History: No Pertinent History


Male Reproductive Disorders: No Pertinent History


Other Medical History: osteopenia, and gout.  extensive arterosclerotic disease 

with abd aortic aneurysm





- Past Surgical History


Past Surgical History: Yes


Neuro Surgical History: No Pertinent History


Cardiac: No Pertinent History


Respiratory: No Pertinent History


Gastrointestinal: No Pertinent History


Genitourinary: Other


Musculoskeletal: No Pertinent History


Male Surgical History: No Pertinent History


Other Surgical History: hx of bladder CA with surgery.  cataract surgery





- Social History


Smoking Status: Never smoker


Exposure to second hand smoke: No


Drug Use: none


Patient Lives Alone: No





- Nursing Vital Signs


Nursing Vital Signs: 


                               Initial Vital Signs











Temperature  98.4 F   05/05/22 12:07


 


Pulse Rate  47 L  05/05/22 12:07


 


Respiratory Rate  20   05/05/22 12:07


 


Blood Pressure  225/97   05/05/22 12:07


 


O2 Sat by Pulse Oximetry  100   05/05/22 12:07








                                   Pain Scale











Pain Intensity []              8


 


Pain Intensity                 2

















- Physical Exam


General Appearance: no apparent distress, alert


Eye Exam: PERRL/EOMI, eyes nml inspection


Neck Exam: normal inspection, non-tender, supple, full range of motion, No 

meningismus, No midline tenderness


Respiratory Exam: normal breath sounds, lungs clear, airway intact, No 

respiratory distress


Cardiovascular Exam: regular rate/rhythm, normal heart sounds, normal peripheral

 pulses


Gastrointestinal Exam: soft, normal bowel sounds, No tenderness, No distention, 

No mass


Extremity Exam: normal inspection, normal range of motion, No calf tenderness, 

No pedal edema


Peripheral Pulses: dorsalis-pedis (R): 2+, dorsalis-pedis (L): 2+


Neurologic Exam: alert, oriented x 3, cooperative, CNs II-XII nml as tested, 

normal mood/affect, nml station & gait, sensation nml, No motor deficits


Skin Exam: normal color, warm, dry, No rash


Lymphatic Exam: No adenopathy


**SpO2 Interpretation**: normal


SpO2: 97


O2 Delivery: Room Air





- Course


Nursing assessment & vital signs reviewed: Yes





- CT Exams


  ** Lumbar Spine


CT Interpretation: Tele-radiologist Report (Age-related osteopenia.  Multiple 

osteophytes, multilevel degenerative disc disease, bilateral L3-L5 degenerative 

facet arthropathy.  Worsening moderate to advanced L to to L3 degenerative disc 

disease normal alignment.  No fractures or dislocations.  Extensive aortic 

calcifications.  With 5.5 cm )





  ** Other


CT Interpretation: Tele-radiologist Report (CT angio.  Compared to 4/16/2020 

again scattered atherosclerotic aorta with distal 4.5 x 5.6 AAA previously 4.1 x

 5.3 cm.  Bilateral runoff demonstrates mild scattered disease without critical 

stenosis/obstruction.  Normal three-vessel runoff to both feet)


Ordered Tests: 


                               Active Orders 24 hr











 Category Date Time Status


 


 IV Insertion STAT Care  05/05/22 12:34 Active


 


 CTA ABD/PEL W FEM RUNOFF [CT] Stat Exams  05/05/22 16:52 Taken


 


 LUMBAR SPINE W/O [CT] Stat Exams  05/05/22 12:38 Completed


 


 CBC W DIFF Stat Lab  05/05/22 12:55 Completed


 


 CMP Stat Lab  05/05/22 12:55 Completed


 


 UA W/RFX CULTURE Stat Lab  05/05/22 12:30 Completed








Medication Summary











Generic Name Dose Route Start Last Admin





  Trade Name Freq  PRN Reason Stop Dose Admin


 


Sodium Nitroprusside 50 mg/  252 mls @ 0 mls/hr  05/05/22 15:25  05/05/22 16:43





  Dextrose  IV  06/04/22 15:24  10 mls/hr





  .Q0M PRN   10 mls/hr





  HYPERTENSION   Administration





  Protocol  





  Titrate  














Discontinued Medications














Generic Name Dose Route Start Last Admin





  Trade Name Freq  PRN Reason Stop Dose Admin


 


Esmolol HCl 1,000 mg/ Sodium  150 mls @ 50 mls/hr  05/05/22 16:00  05/05/22 

18:57





  Chloride  IV  05/05/22 18:59  50 mls/hr





  .Q3H BRIJESH   50 mls/hr





    Administration


 


Morphine Sulfate  2 mg  05/05/22 12:34  05/05/22 12:40





  Morphine Sulfate 2 Mg/Ml Inj  IV  05/05/22 12:35  2 mg





  STAT ONE   Administration


 


Morphine Sulfate  Confirm  05/05/22 12:39 





  Morphine Sulfate 2 Mg/Ml Inj  Administered  05/05/22 12:40 





  Dose  





  2 mg  





  .ROUTE  





  .STK-MED ONE  


 


Ondansetron HCl  4 mg  05/05/22 12:34  05/05/22 12:40





  Ondansetron Hcl 4 Mg/2 Ml Vial  IV  05/05/22 12:35  4 mg





  STAT ONE   Administration


 


Ondansetron HCl  Confirm  05/05/22 12:39 





  Ondansetron Hcl 4 Mg/2 Ml Vial  Administered  05/05/22 12:40 





  Dose  





  4 mg  





  .ROUTE  





  .STK-MED ONE  











Lab/Rad Data: 


                           Laboratory Result Diagrams





                                 05/05/22 12:55 





                                 05/05/22 12:55 





                               Laboratory Results











  05/05/22 05/05/22 05/05/22 Range/Units





  12:55 12:55 12:30 


 


WBC   8.0   (4.0-10.5)  K/mm3


 


RBC   4.55   (4.1-5.6)  M/mm3


 


Hgb   14.8   (12.5-18.0)  gm/dl


 


Hct   44.5   (42-50)  %


 


MCV   97.8   ()  fl


 


MCH   32.5 H   (26-32)  pg


 


MCHC   33.3   (32-36)  g/dl


 


RDW   14.3 H   (11.5-14.0)  %


 


Plt Count   194   (150-450)  K/mm3


 


MPV   10.2   (7.5-11.0)  fl


 


Gran %   73.8 H   (36.0-66.0)  %


 


Eos # (Auto)   0.26   (0-0.5)  


 


Absolute Lymphs (auto)   1.17   (1.0-4.6)  


 


Absolute Monos (auto)   0.64   (0.0-1.3)  


 


Lymphocytes %   14.6 L   (24.0-44.0)  %


 


Monocytes %   8.0   (0.0-12.0)  %


 


Eosinophils %   3.2   (0.00-5.0)  %


 


Basophils %   0.4   (0.0-0.4)  %


 


Absolute Granulocytes   5.91   (1.4-6.9)  


 


Basophils #   0.03   (0-0.4)  


 


Sodium  138    (137-145)  mmol/L


 


Potassium  4.4    (3.5-5.1)  mmol/L


 


Chloride  102    ()  mmol/L


 


Carbon Dioxide  26    (22-30)  mmol/L


 


Anion Gap  14.0    (5-15)  MEQ/L


 


BUN  37 H    (9-20)  mg/dL


 


Creatinine  1.57 H    (0.66-1.25)  mg/dL


 


Estimated GFR  44.9    ML/MIN


 


Glucose  92    ()  mg/dL


 


Calcium  10.7 H    (8.4-10.2)  mg/dL


 


Total Bilirubin  0.70    (0.2-1.3)  mg/dL


 


AST  26    (17-59)  U/L


 


ALT  21    (0-50)  U/L


 


Alkaline Phosphatase  91    ()  U/L


 


Serum Total Protein  7.5    (6.3-8.2)  g/dL


 


Albumin  4.3    (3.5-5.0)  g/dL


 


Urinalys Dipstick Clnc    MAIN LAB  


 


Urine Color    YELLOW  (YELLOW)  


 


Urine Appearance    CLEAR  (CLEAR)  


 


Urine pH    7.0  (5-6)  


 


Ur Specific Gravity    1.015  (1.005-1.025)  


 


POC Urine Protein Conf    NEGATIVE  (Negative)  


 


Urine Ketones    NEGATIVE  (NEGATIVE)  


 


Urine Nitrite    NEGATIVE  (NEGATIVE)  


 


Urine Bilirubin    NEGATIVE  (NEGATIVE)  


 


Urine Urobilinogen    0.2  (0-1)  mg/dL


 


Urine Leukocytes    NEGATIVE  (NEGATIVE)  


 


Urine WBC (Auto)    NONE  (0-5)  /HPF


 


Urine RBC (Auto)    NONE  (0-2)  /HPF


 


U Epithel Cells (Auto)    NONE  (FEW)  /HPF


 


Urine Bacteria (Auto)    NONE  (NEGATIVE)  /HPF


 


Urine RBC    NEGATIVE  (0-5)  Lane/ul


 


Ur Culture Indicated?    NO  


 


Urine Glucose    NEGATIVE  (NEGATIVE)  mg/dL














- Progress


Progress: improved


Progress Note: 


Patient's AAA has enlarged from 4.2 cm to 4.8 cm on 10/22/2020 today 5/5/2022 

AAA measures 5.5 cm.





Case discussed with Dr. Gracia who advised transfer to [facility].  Patient's

 surgeon Dr. Ronny Xiong


05/05/22 15:34


Case discussed with Dr. Gutiérrez vascular surgery at [facility].  He does not 

agree with the 5.5 cm AAA read.  He is requesting a CTA to further assess the 

AAA for accurate read to determine whether or not patient will require surgical 

intervention or not


05/05/22 17:01





Counseled pt/family regarding: diagnosis, need for follow-up, rad results





- Departure


Departure Disposition: Home


Clinical Impression: 


 Acute renal injury, Acute exacerbation of chronic low back pain, Degenerative 

disc disease, Arteriosclerotic cardiovascular disease, Distal 5.5 cm abdominal 

aortic aneurysm, Hypertensive urgency, Enlarging abdominal aortic aneurysm (AAA)





Condition: Stable


Critical Care Time: No


Referrals: 


VICKY GRACIA MD [Primary Care Provider] - Follow up/PCP as directed

## 2022-05-06 NOTE — XRAY
Indication: AAA.



Conventional contrast enhanced CTA abdominal aorta with bilateral runoff

performed using 125 cc Isovue 370 contrast.  Two-dimensional sagittal and

coronal reformatted images obtained.  Additional 3-dimensional reformatted

images obtained using a separate workstation.



Comparison: CTA abdomen/pelvis April 16, 2020.



Abdominal aorta again demonstrates moderate scattered arteriosclerotic

calcifications.  There remains mild calcifications at the origin of the

superior mesenteric and both main renal arteries without critical

stenosis/obstruction.  Remaining celiac and inferior mesenteric arteries are

normal in CTA appearance.  Distal abdominal aorta again demonstrates

saccular-like aneurysm measuring 4.5 x 5.6 in the axial dimension, previously

4.1 x 5.3 cm.  No free fluid or evidence for active hemorrhage.



Right leg runoff demonstrates minimal/mild scattered arteriosclerotic disease

in the common iliac, distal common femoral, popliteal, distal tibial peroneal

trunk, and posterior tibial arteries without critical stenosis/obstruction.

Remaining external iliac, deep femoral, superficial femoral, anterior tibial,

and peroneal arteries are normal in CTA appearance.  Normal three-vessel

runoff into right foot.



Left leg runoff demonstrates minimal/mild scattered arteriosclerotic disease

in the distal common femoral, popliteal, and posterior tibial arteries without

critical stenosis/obstruction.  Remaining common iliac, external iliac, deep

femoral, superficial femoral, anterior tibial, and peroneal arteries are

normal in CTA appearance.  Normal three-vessel runoff into left foot.



Noncontrasted stomach and bowel loops are nonobstructed.  Again mild fatty

hepatomegaly measuring 20 cm and 12.8 cm splenomegaly.  Stable tiny gallstone,

tiny hepatic/splenic calcified granulomas, small right renal cysts, and

enlarged prostate gland.  No free fluid/air.



Lung bases again demonstrates cardiomegaly, scattered fibrosis/scarring, and

small right lower lobe calcified granuloma.



Visualized osseous structures intact again with osteopenia, moderate

degenerative changes throughout the spine, and mild degenerative changes both

hips.



Left leg demonstrates incidental 4.8 x 6.7 x 8.6 cm semimembranosus

intramuscular lipoma.



Impression:

1.  Again scattered arteriosclerotic disease with very minimally enlarging

distal AAA.

2.  Bilateral leg runoff demonstrates minimal/mild scattered arteriosclerotic

without critical stenosis/obstruction.  Three-vessel runoff into both feet.

3.  Again incidental cardiomegaly, pulmonary fibrosis/scarring, old

granulomatous disease, fatty hepatomegaly, splenomegaly, right renal cysts,

tiny gallstone, enlarged prostate, and chronic bony findings.

4.  Incidental left leg semimembranosus intramuscular lipoma not previously

imaged.

## 2022-12-21 ENCOUNTER — HOSPITAL ENCOUNTER (OUTPATIENT)
Dept: HOSPITAL 33 - SDC-PAIN | Age: 87
Discharge: HOME | End: 2022-12-21
Attending: PSYCHIATRY & NEUROLOGY
Payer: MEDICARE

## 2022-12-21 DIAGNOSIS — M54.16: Primary | ICD-10-CM

## 2022-12-21 DIAGNOSIS — Z79.899: ICD-10-CM

## 2022-12-21 DIAGNOSIS — Z79.01: ICD-10-CM

## 2022-12-21 LAB
INR PPP: 1.3 (ref 0.8–3)
PROTHROMBIN TIME: 13.5 SECONDS (ref 9.4–12.5)

## 2022-12-21 PROCEDURE — 72100 X-RAY EXAM L-S SPINE 2/3 VWS: CPT

## 2022-12-21 PROCEDURE — 62323 NJX INTERLAMINAR LMBR/SAC: CPT

## 2022-12-21 PROCEDURE — 85610 PROTHROMBIN TIME: CPT

## 2022-12-21 PROCEDURE — 77003 FLUOROGUIDE FOR SPINE INJECT: CPT

## 2022-12-21 PROCEDURE — 36415 COLL VENOUS BLD VENIPUNCTURE: CPT

## 2022-12-21 NOTE — XRAY
Indication: Caudal RICARDO.



Intraoperative fluoroscopy provided 17 seconds.  6 digital spot image

submitted for interpretation demonstrates caudal needle tip projecting mid

sacrum.  Small amount of contrast injected for needle tip placement.

Correlate with intraoperative findings/report.  Incidental partially

visualized bilateral iliac stent grafts.

## 2023-01-25 ENCOUNTER — HOSPITAL ENCOUNTER (OUTPATIENT)
Dept: HOSPITAL 33 - SDC-PAIN | Age: 88
Discharge: HOME | End: 2023-01-25
Attending: PSYCHIATRY & NEUROLOGY
Payer: MEDICARE

## 2023-01-25 DIAGNOSIS — M47.816: Primary | ICD-10-CM

## 2023-01-25 DIAGNOSIS — Z79.899: ICD-10-CM

## 2023-01-25 DIAGNOSIS — Z79.01: ICD-10-CM

## 2023-01-25 LAB
INR PPP: 1.71 (ref 0.8–3)
PROTHROMBIN TIME: 17.3 SECONDS (ref 9.4–12.5)

## 2023-01-25 PROCEDURE — 72020 X-RAY EXAM OF SPINE 1 VIEW: CPT

## 2023-01-25 PROCEDURE — 85610 PROTHROMBIN TIME: CPT

## 2023-01-25 PROCEDURE — 36415 COLL VENOUS BLD VENIPUNCTURE: CPT

## 2023-01-25 PROCEDURE — 77002 NEEDLE LOCALIZATION BY XRAY: CPT

## 2023-01-25 PROCEDURE — 64493 INJ PARAVERT F JNT L/S 1 LEV: CPT

## 2023-01-25 PROCEDURE — 64494 INJ PARAVERT F JNT L/S 2 LEV: CPT

## 2023-01-25 NOTE — XRAY
Indication: Bilateral L4-S1 MBB.



Intraoperative fluoroscopy provided for 17 seconds.  Single digital spot image

submitted for interpretation demonstrates posterior needle tips projecting

over the expected left and right L4-S1 nerve roots.  Correlate with

intraoperative findings/report.